# Patient Record
Sex: MALE | Race: WHITE
[De-identification: names, ages, dates, MRNs, and addresses within clinical notes are randomized per-mention and may not be internally consistent; named-entity substitution may affect disease eponyms.]

---

## 2020-02-01 ENCOUNTER — HOSPITAL ENCOUNTER (EMERGENCY)
Dept: HOSPITAL 62 - ER | Age: 30
LOS: 1 days | Discharge: HOME | End: 2020-02-02
Payer: OTHER GOVERNMENT

## 2020-02-01 DIAGNOSIS — S89.91XA: Primary | ICD-10-CM
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
## 2020-02-01 DIAGNOSIS — S80.11XA: ICD-10-CM

## 2020-02-01 DIAGNOSIS — M79.89: ICD-10-CM

## 2020-02-01 DIAGNOSIS — F17.200: ICD-10-CM

## 2020-02-01 DIAGNOSIS — S80.811A: ICD-10-CM

## 2020-02-01 LAB
ADD MANUAL DIFF: NO
ALBUMIN SERPL-MCNC: 4.1 G/DL (ref 3.5–5)
ALP SERPL-CCNC: 55 U/L (ref 38–126)
ANION GAP SERPL CALC-SCNC: 7 MMOL/L (ref 5–19)
AST SERPL-CCNC: 27 U/L (ref 17–59)
BASOPHILS # BLD AUTO: 0 10^3/UL (ref 0–0.2)
BASOPHILS NFR BLD AUTO: 0.8 % (ref 0–2)
BILIRUB DIRECT SERPL-MCNC: 0 MG/DL (ref 0–0.4)
BILIRUB SERPL-MCNC: 1.2 MG/DL (ref 0.2–1.3)
BUN SERPL-MCNC: 12 MG/DL (ref 7–20)
CALCIUM: 9.3 MG/DL (ref 8.4–10.2)
CHLORIDE SERPL-SCNC: 103 MMOL/L (ref 98–107)
CO2 SERPL-SCNC: 29 MMOL/L (ref 22–30)
EOSINOPHIL # BLD AUTO: 0.4 10^3/UL (ref 0–0.6)
EOSINOPHIL NFR BLD AUTO: 6.3 % (ref 0–6)
ERYTHROCYTE [DISTWIDTH] IN BLOOD BY AUTOMATED COUNT: 12.5 % (ref 11.5–14)
GLUCOSE SERPL-MCNC: 87 MG/DL (ref 75–110)
HCT VFR BLD CALC: 39.3 % (ref 37.9–51)
HGB BLD-MCNC: 13.4 G/DL (ref 13.5–17)
LYMPHOCYTES # BLD AUTO: 2.6 10^3/UL (ref 0.5–4.7)
LYMPHOCYTES NFR BLD AUTO: 42.1 % (ref 13–45)
MCH RBC QN AUTO: 29.4 PG (ref 27–33.4)
MCHC RBC AUTO-ENTMCNC: 34.1 G/DL (ref 32–36)
MCV RBC AUTO: 86 FL (ref 80–97)
MONOCYTES # BLD AUTO: 0.7 10^3/UL (ref 0.1–1.4)
MONOCYTES NFR BLD AUTO: 11.5 % (ref 3–13)
NEUTROPHILS # BLD AUTO: 2.4 10^3/UL (ref 1.7–8.2)
NEUTS SEG NFR BLD AUTO: 39.3 % (ref 42–78)
PLATELET # BLD: 192 10^3/UL (ref 150–450)
POTASSIUM SERPL-SCNC: 4.1 MMOL/L (ref 3.6–5)
PROT SERPL-MCNC: 7 G/DL (ref 6.3–8.2)
RBC # BLD AUTO: 4.55 10^6/UL (ref 4.35–5.55)
TOTAL CELLS COUNTED % (AUTO): 100 %
WBC # BLD AUTO: 6.2 10^3/UL (ref 4–10.5)

## 2020-02-01 PROCEDURE — 80053 COMPREHEN METABOLIC PANEL: CPT

## 2020-02-01 PROCEDURE — 85025 COMPLETE CBC W/AUTO DIFF WBC: CPT

## 2020-02-01 PROCEDURE — 36415 COLL VENOUS BLD VENIPUNCTURE: CPT

## 2020-02-01 PROCEDURE — 99283 EMERGENCY DEPT VISIT LOW MDM: CPT
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
## 2020-02-01 NOTE — RADIOLOGY REPORT (SQ)
Right knee three view on 2/1/2020 at 10:28 PM



CLINICAL INDICATION: Injury, pain and swelling



COMPARISON: None



FINDINGS: No joint effusion is noted. There are no fractures.

Visualized joints are well aligned. No bony abnormality is noted.



IMPRESSION: No acute abnormality.

## 2020-02-02 VITALS — DIASTOLIC BLOOD PRESSURE: 89 MMHG | SYSTOLIC BLOOD PRESSURE: 137 MMHG
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
## 2020-11-01 ENCOUNTER — HOSPITAL ENCOUNTER (EMERGENCY)
Dept: HOSPITAL 62 - ER | Age: 30
Discharge: HOME | End: 2020-11-01
Payer: OTHER GOVERNMENT

## 2020-11-01 VITALS — DIASTOLIC BLOOD PRESSURE: 82 MMHG | SYSTOLIC BLOOD PRESSURE: 132 MMHG

## 2020-11-01 DIAGNOSIS — R42: ICD-10-CM

## 2020-11-01 DIAGNOSIS — S00.81XA: ICD-10-CM

## 2020-11-01 DIAGNOSIS — S06.0X9A: Primary | ICD-10-CM

## 2020-11-01 DIAGNOSIS — R51.9: ICD-10-CM

## 2020-11-01 DIAGNOSIS — R11.0: ICD-10-CM

## 2020-11-01 DIAGNOSIS — F17.200: ICD-10-CM
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
## 2020-11-01 DIAGNOSIS — S00.03XA: ICD-10-CM
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
## 2020-11-01 PROCEDURE — 70450 CT HEAD/BRAIN W/O DYE: CPT

## 2020-11-01 PROCEDURE — 99285 EMERGENCY DEPT VISIT HI MDM: CPT

## 2020-11-01 PROCEDURE — S0119 ONDANSETRON 4 MG: HCPCS

## 2020-11-01 NOTE — RADIOLOGY REPORT (SQ)
EXAM DESCRIPTION:  CT HEAD WITHOUT



IMAGES COMPLETED DATE/TIME:  11/1/2020 5:19 pm



REASON FOR STUDY:  head injury



COMPARISON:  None.



TECHNIQUE:  Axial images acquired through the brain without intravenous contrast.  Images reviewed wi
th bone, brain and subdural windows.  Additional sagittal and coronal reconstructions were generated.
 Images stored on PACS.

All CT scanners at this facility use dose modulation, iterative reconstruction, and/or weight based d
osing when appropriate to reduce radiation dose to as low as reasonably achievable (ALARA).

[facility]: Dose Right  [facility]: CareDose    [facility]: Dose Right    [facility]: Teradose 4D    [facility]: Smart Jumping Nuts



RADIATION DOSE:  CT Rad equipment meets quality standard of care and radiation dose reduction techniq
ues were employed. CTDIvol: 53.2 mGy. DLP: 1017 mGy-cm. mGy.



LIMITATIONS:  None.



FINDINGS:  VENTRICLES: Normal size and contour.

CEREBRUM: No masses.  No hemorrhage.  No midline shift.  No evidence for acute infarction. Normal gra
y/white matter differentiation. No areas of low density in the white matter.

CEREBELLUM: No masses.  No hemorrhage.  No alteration of density.  No evidence for acute infarction.

EXTRAAXIAL SPACES: No fluid collections.  No masses.

ORBITS AND GLOBE: No intra- or extraconal masses.  Normal contour of globe without masses.

CALVARIUM: No fracture.

PARANASAL SINUSES: No fluid or mucosal thickening.

SOFT TISSUES: No mass or hematoma.

OTHER: No other significant finding.



IMPRESSION:  NORMAL BRAIN CT WITHOUT CONTRAST.

EVIDENCE OF ACUTE STROKE: NO.



COMMENT:  Quality ID # [id number]: Final reports with documentation of one or more dose reduction techniques
 (e.g., Automated exposure control, adjustment of the mA and/or kV according to patient size, use of 
iterative reconstruction technique)



TECHNICAL DOCUMENTATION:  JOB ID:  [id number]

 2011 [employer]- All Rights Reserved



Reading location - IP/workstation name: [email or web address]

## 2020-11-01 NOTE — ER DOCUMENT REPORT
HPI





- HPI


Patient complains to provider of: Head injury


Time Seen by Provider: 11/01/20 16:46


Pain Level: 4


Notes: 





30-year-old male to the emergency department with complaints of a head injury 

that occurred just prior to arrival.  He states he was walking on the stairs in 

his home and slipped and fell.  He thinks he may have hit a baby gate.  He does 

admit to a loss of consciousness briefly.  He also admits to nausea but no 

vomiting.  He admits to dizziness and some mental fogginess as well.  He denies 

any neck pain or any other injuries.  He is up-to-date on his tetanus shot.





- ROS


Systems Reviewed and Negative: Yes All other systems reviewed and negative





- CONSTITUTIONAL


Constitutional: DENIES: Fever, Chills





- EENT


EENT: DENIES: Sore Throat, Ear Pain, Congestion





- NEURO


Neurology: REPORTS: Headache, Dizzinesss / Vertigo.  DENIES: Weakness, Vision 

blurred





- CARDIOVASCULAR


Cardiovascular: DENIES: Chest pain





- RESPIRATORY


Respiratory: DENIES: Trouble Breathing, Coughing





- GASTROINTESTINAL


Gastrointestinal: REPORTS: Nausea.  DENIES: Abdominal Pain, Patient vomiting, 

Diarrhea





- MUSCULOSKELETAL


Musculoskeletal: DENIES: Extremity pain, Back Pain, Neck Pain, Swelling





- DERM


Skin Color: Normal


Skin Problems: None





Past Medical History





- General


Information source: Patient





- Social History


Smoking Status: Current Every Day Smoker


Frequency of alcohol use: Sober for 1 year


Drug Abuse: None


Family History: Reviewed & Not Pertinent


Patient has homicidal ideation: No





- Immunizations


Immunizations up to date: Yes


Hx Diphtheria, Pertussis, Tetanus Vaccination: Yes





Vertical Provider Document





- CONSTITUTIONAL


Agree With Documented VS: Yes


Exam Limitations: No Limitations


General Appearance: WD/WN, No Apparent Distress





- INFECTION CONTROL


TRAVEL OUTSIDE OF THE [place] IN LAST 30 DAYS: No





- HEENT


HEENT: Normocephalic, PERRLA


Notes: 





Small abrasion to the frontal forehead.  No wolfe sign, raccoon eyes, 

hemotympanum.  There is no laceration that requires repair.  There is a minor 

contusion at the scalp line but without any crepitus or step-off.  Tenderness to

palpation to the anterior forehead.  TMs are clear bilaterally.  Airway is 

grossly patent.  No intraoral trauma





- NECK


Neck: Normal Inspection, Supple


Notes: 





Nontender to palpation of the midline cervical spine with no step-off or 

deformity





- RESPIRATORY


Respiratory: Breath Sounds Normal, No Respiratory Distress.  negative: Rales, 

Rhonchi, Wheezing





- CARDIOVASCULAR


Cardiovascular: Regular Rate, Regular Rhythm, No Murmur





- GI/ABDOMEN


Gastrointestinal: Abdomen Soft, Abdomen Non-Tender, No Organomegaly





- BACK


Back: Normal Inspection





- MUSCULOSKELETAL/EXTREMETIES


Musculoskeletal/Extremeties: MAEW, FROM, Non-Tender





- NEURO


Level of Consciousness: Awake, Alert, Appropriate


Motor/Sensory: No Motor Deficit, No Sensory Deficit


Notes: 





Cranial nerves II through XII are intact.  No pronator drift.  Normal 

finger-to-nose bilaterally.  No nystagmus.  No photophobia.  No leg drift.  

Patient can ambulate without any difficulty.  He does complain of some mental 

fogginess although he is alert and oriented x3.





- DERM


Integumentary: Warm, Dry





Course





- Re-evaluation


Re-evalutation: 





11/01/20 


Impression: Closed head injury with loss of consciousness and concussion.  Head 

CT is negative for fracture or intracranial injury.  We will have the patient f

ollow-up with neurology.  Have given concussion precautions.  Will send home 

with pain medicines and antiemetics.








- Diagnostic Test


Radiology reviewed: Image reviewed, Reports reviewed





Discharge





- Discharge


Clinical Impression: 


 Head injury with loss of consciousness





Concussion


Qualifiers:


 Encounter type: initial encounter Loss of consciousness presence/duration: with

LOC of unspecified duration Qualified Code(s): S06.0X9A - Concussion with loss 

of consciousness of unspecified duration, initial encounter





Condition: Stable


Disposition: HOME, SELF-CARE


Instructions:  Concussion (OM), Headache (OM)


Additional Instructions: 


CT of the brain today was normal with no evidence for bleeding or skull 

fracture.  However given your history, suspect that you have sustained a 

concussion.  Please rest.  Concussion symptoms can last for some time and what 

is called post concussion syndrome.  [place] you to neurology.  Take medicines as prescribed.  Return if 

worsening symptoms.


Prescriptions: 


Ondansetron [Zofran Odt 4 mg Tablet] 1 - 2 tab PO Q4HP PRN #20 tab.rapdis


 PRN Reason: 


Butalb/Acetaminophen/Caffeine [Fioricet (-40 mg) Tablet] 1 tab PO Q6H #20 

tab


Forms:  Return to Work


Referrals: 


ADALID MANCINI MD [NO LOCAL MD] - Follow up in 3-5 days (for neurology follow 

up)